# Patient Record
Sex: FEMALE | Race: WHITE | Employment: UNEMPLOYED | URBAN - METROPOLITAN AREA
[De-identification: names, ages, dates, MRNs, and addresses within clinical notes are randomized per-mention and may not be internally consistent; named-entity substitution may affect disease eponyms.]

---

## 2019-10-18 ENCOUNTER — HOSPITAL ENCOUNTER (OUTPATIENT)
Age: 39
Discharge: HOME | End: 2019-10-18
Payer: COMMERCIAL

## 2019-10-18 PROCEDURE — 90471 IMMUNIZATION ADMIN: CPT

## 2019-10-18 PROCEDURE — 90686 IIV4 VACC NO PRSV 0.5 ML IM: CPT

## 2020-09-28 ENCOUNTER — LAB REQUISITION (OUTPATIENT)
Dept: LAB | Facility: HOSPITAL | Age: 40
End: 2020-09-28
Payer: COMMERCIAL

## 2020-09-28 DIAGNOSIS — Z11.51 ENCOUNTER FOR SCREENING FOR HUMAN PAPILLOMAVIRUS (HPV): ICD-10-CM

## 2020-09-28 DIAGNOSIS — Z12.4 ENCOUNTER FOR SCREENING FOR MALIGNANT NEOPLASM OF CERVIX: ICD-10-CM

## 2020-09-28 DIAGNOSIS — Z01.419 ENCOUNTER FOR GYNECOLOGICAL EXAMINATION (GENERAL) (ROUTINE) WITHOUT ABNORMAL FINDINGS: ICD-10-CM

## 2020-09-28 PROCEDURE — 87624 HPV HI-RISK TYP POOLED RSLT: CPT | Performed by: OBSTETRICS & GYNECOLOGY

## 2020-09-28 PROCEDURE — 88175 CYTOPATH C/V AUTO FLUID REDO: CPT | Performed by: OBSTETRICS & GYNECOLOGY

## 2020-10-14 ENCOUNTER — HOSPITAL ENCOUNTER (OUTPATIENT)
Age: 40
Discharge: HOME | End: 2020-10-14
Payer: COMMERCIAL

## 2020-10-14 PROCEDURE — 90471 IMMUNIZATION ADMIN: CPT

## 2020-10-14 PROCEDURE — 90686 IIV4 VACC NO PRSV 0.5 ML IM: CPT

## 2021-10-13 ENCOUNTER — HOSPITAL ENCOUNTER (OUTPATIENT)
Age: 41
Discharge: HOME | End: 2021-10-13
Payer: COMMERCIAL

## 2021-10-13 VITALS — TEMPERATURE: 98 F

## 2021-10-13 PROCEDURE — 90471 IMMUNIZATION ADMIN: CPT | Performed by: EMERGENCY MEDICINE

## 2021-10-13 PROCEDURE — 90686 IIV4 VACC NO PRSV 0.5 ML IM: CPT | Performed by: EMERGENCY MEDICINE

## 2022-12-08 ENCOUNTER — HOSPITAL ENCOUNTER (OUTPATIENT)
Age: 42
Discharge: HOME | End: 2022-12-08
Payer: COMMERCIAL

## 2022-12-08 PROCEDURE — 90471 IMMUNIZATION ADMIN: CPT | Performed by: NURSE PRACTITIONER

## 2022-12-08 PROCEDURE — 90686 IIV4 VACC NO PRSV 0.5 ML IM: CPT | Performed by: NURSE PRACTITIONER

## 2023-07-10 ENCOUNTER — LAB ENCOUNTER (OUTPATIENT)
Dept: LAB | Facility: REFERENCE LAB | Age: 43
End: 2023-07-10
Attending: FAMILY MEDICINE
Payer: COMMERCIAL

## 2023-07-10 ENCOUNTER — OFFICE VISIT (OUTPATIENT)
Facility: CLINIC | Age: 43
End: 2023-07-10
Payer: COMMERCIAL

## 2023-07-10 VITALS
OXYGEN SATURATION: 99 % | WEIGHT: 96 LBS | BODY MASS INDEX: 19.35 KG/M2 | DIASTOLIC BLOOD PRESSURE: 76 MMHG | SYSTOLIC BLOOD PRESSURE: 120 MMHG | HEIGHT: 59 IN | HEART RATE: 97 BPM

## 2023-07-10 DIAGNOSIS — F41.9 ANXIETY: ICD-10-CM

## 2023-07-10 DIAGNOSIS — Z12.4 PAP SMEAR FOR CERVICAL CANCER SCREENING: ICD-10-CM

## 2023-07-10 DIAGNOSIS — Z00.00 ENCOUNTER FOR ROUTINE ADULT HEALTH EXAMINATION WITHOUT ABNORMAL FINDINGS: Primary | ICD-10-CM

## 2023-07-10 DIAGNOSIS — Z12.31 SCREENING MAMMOGRAM, ENCOUNTER FOR: ICD-10-CM

## 2023-07-10 DIAGNOSIS — Z00.00 ENCOUNTER FOR ROUTINE ADULT HEALTH EXAMINATION WITHOUT ABNORMAL FINDINGS: ICD-10-CM

## 2023-07-10 LAB
ALBUMIN SERPL-MCNC: 4 G/DL (ref 3.4–5)
ALBUMIN/GLOB SERPL: 1.1 {RATIO} (ref 1–2)
ALP LIVER SERPL-CCNC: 75 U/L
ALT SERPL-CCNC: 20 U/L
ANION GAP SERPL CALC-SCNC: 7 MMOL/L (ref 0–18)
AST SERPL-CCNC: 17 U/L (ref 15–37)
BASOPHILS # BLD AUTO: 0.04 X10(3) UL (ref 0–0.2)
BASOPHILS NFR BLD AUTO: 0.7 %
BILIRUB SERPL-MCNC: 0.5 MG/DL (ref 0.1–2)
BUN BLD-MCNC: 13 MG/DL (ref 7–18)
BUN/CREAT SERPL: 15.5 (ref 10–20)
CALCIUM BLD-MCNC: 9.1 MG/DL (ref 8.5–10.1)
CHLORIDE SERPL-SCNC: 107 MMOL/L (ref 98–112)
CHOLEST SERPL-MCNC: 194 MG/DL (ref ?–200)
CO2 SERPL-SCNC: 27 MMOL/L (ref 21–32)
CREAT BLD-MCNC: 0.84 MG/DL
DEPRECATED RDW RBC AUTO: 41.2 FL (ref 35.1–46.3)
EOSINOPHIL # BLD AUTO: 0.18 X10(3) UL (ref 0–0.7)
EOSINOPHIL NFR BLD AUTO: 3.3 %
ERYTHROCYTE [DISTWIDTH] IN BLOOD BY AUTOMATED COUNT: 11.4 % (ref 11–15)
EST. AVERAGE GLUCOSE BLD GHB EST-MCNC: 105 MG/DL (ref 68–126)
FASTING PATIENT LIPID ANSWER: NO
FASTING STATUS PATIENT QL REPORTED: NO
GFR SERPLBLD BASED ON 1.73 SQ M-ARVRAT: 89 ML/MIN/1.73M2 (ref 60–?)
GLOBULIN PLAS-MCNC: 3.5 G/DL (ref 2.8–4.4)
GLUCOSE BLD-MCNC: 88 MG/DL (ref 70–99)
HBA1C MFR BLD: 5.3 % (ref ?–5.7)
HCT VFR BLD AUTO: 45.3 %
HCV AB SERPL QL IA: NONREACTIVE
HDLC SERPL-MCNC: 89 MG/DL (ref 40–59)
HGB BLD-MCNC: 15.2 G/DL
IMM GRANULOCYTES # BLD AUTO: 0.01 X10(3) UL (ref 0–1)
IMM GRANULOCYTES NFR BLD: 0.2 %
LDLC SERPL CALC-MCNC: 90 MG/DL (ref ?–100)
LYMPHOCYTES # BLD AUTO: 1.2 X10(3) UL (ref 1–4)
LYMPHOCYTES NFR BLD AUTO: 21.7 %
MCH RBC QN AUTO: 33 PG (ref 26–34)
MCHC RBC AUTO-ENTMCNC: 33.6 G/DL (ref 31–37)
MCV RBC AUTO: 98.3 FL
MONOCYTES # BLD AUTO: 0.6 X10(3) UL (ref 0.1–1)
MONOCYTES NFR BLD AUTO: 10.8 %
NEUTROPHILS # BLD AUTO: 3.5 X10 (3) UL (ref 1.5–7.7)
NEUTROPHILS # BLD AUTO: 3.5 X10(3) UL (ref 1.5–7.7)
NEUTROPHILS NFR BLD AUTO: 63.3 %
NONHDLC SERPL-MCNC: 105 MG/DL (ref ?–130)
OSMOLALITY SERPL CALC.SUM OF ELEC: 292 MOSM/KG (ref 275–295)
PLATELET # BLD AUTO: 330 10(3)UL (ref 150–450)
POTASSIUM SERPL-SCNC: 3.8 MMOL/L (ref 3.5–5.1)
PROT SERPL-MCNC: 7.5 G/DL (ref 6.4–8.2)
RBC # BLD AUTO: 4.61 X10(6)UL
SODIUM SERPL-SCNC: 141 MMOL/L (ref 136–145)
TRIGL SERPL-MCNC: 82 MG/DL (ref 30–149)
VLDLC SERPL CALC-MCNC: 13 MG/DL (ref 0–30)
WBC # BLD AUTO: 5.5 X10(3) UL (ref 4–11)

## 2023-07-10 PROCEDURE — 83036 HEMOGLOBIN GLYCOSYLATED A1C: CPT

## 2023-07-10 PROCEDURE — 86803 HEPATITIS C AB TEST: CPT

## 2023-07-10 PROCEDURE — 80053 COMPREHEN METABOLIC PANEL: CPT

## 2023-07-10 PROCEDURE — 36415 COLL VENOUS BLD VENIPUNCTURE: CPT

## 2023-07-10 PROCEDURE — 87624 HPV HI-RISK TYP POOLED RSLT: CPT | Performed by: FAMILY MEDICINE

## 2023-07-10 PROCEDURE — 85025 COMPLETE CBC W/AUTO DIFF WBC: CPT

## 2023-07-10 PROCEDURE — 80061 LIPID PANEL: CPT

## 2023-07-10 RX ORDER — LORAZEPAM 0.5 MG/1
0.5 TABLET ORAL DAILY PRN
Qty: 10 TABLET | Refills: 0 | Status: SHIPPED | OUTPATIENT
Start: 2023-07-10

## 2023-07-10 RX ORDER — HYDROXYZINE HYDROCHLORIDE 25 MG/1
25 TABLET, FILM COATED ORAL NIGHTLY PRN
Qty: 30 TABLET | Refills: 1 | Status: SHIPPED | OUTPATIENT
Start: 2023-07-10

## 2023-07-11 LAB — HPV I/H RISK 1 DNA SPEC QL NAA+PROBE: NEGATIVE

## 2023-07-18 LAB
LAST PAP RESULT: NORMAL
PAP HISTORY (OTHER THAN LAST PAP): NORMAL

## 2023-08-30 ENCOUNTER — HOSPITAL ENCOUNTER (OUTPATIENT)
Dept: MAMMOGRAPHY | Age: 43
Discharge: HOME | End: 2023-08-30
Attending: FAMILY MEDICINE
Payer: COMMERCIAL

## 2023-08-30 DIAGNOSIS — Z12.31 SCREENING MAMMOGRAM, ENCOUNTER FOR: ICD-10-CM

## 2023-08-30 PROCEDURE — 77063 BREAST TOMOSYNTHESIS BI: CPT | Performed by: FAMILY MEDICINE

## 2023-08-30 PROCEDURE — 77067 SCR MAMMO BI INCL CAD: CPT | Performed by: FAMILY MEDICINE

## 2023-09-09 ENCOUNTER — PATIENT MESSAGE (OUTPATIENT)
Facility: CLINIC | Age: 43
End: 2023-09-09

## 2023-09-10 NOTE — TELEPHONE ENCOUNTER
Please review; protocol failed. Requested Prescriptions   Pending Prescriptions Disp Refills    LORazepam 0.5 MG Oral Tab 10 tablet 0     Sig: Take 1 tablet (0.5 mg total) by mouth daily as needed for Anxiety (Panic attacks).        There is no refill protocol information for this order              Recent Outpatient Visits              2 months ago Encounter for routine adult health examination without abnormal findings    [address], [place], [place]    Office Visit

## 2023-09-11 RX ORDER — LORAZEPAM 0.5 MG/1
0.5 TABLET ORAL DAILY PRN
Qty: 10 TABLET | Refills: 0 | Status: SHIPPED | OUTPATIENT
Start: 2023-09-11

## 2023-12-06 ENCOUNTER — PATIENT MESSAGE (OUTPATIENT)
Facility: CLINIC | Age: 43
End: 2023-12-06

## 2023-12-07 RX ORDER — LORAZEPAM 0.5 MG/1
0.5 TABLET ORAL DAILY PRN
Qty: 15 TABLET | Refills: 1 | Status: SHIPPED | OUTPATIENT
Start: 2023-12-07

## 2023-12-07 NOTE — TELEPHONE ENCOUNTER
Please review; protocol failed. Please see patients MyChart Message     Eusebia Any PAUL Em Triage Support (supporting Eliverto Bloch, DO)23 hours ago (12:06 PM)     Pablo Moncada,      I have a small amount of medicine left from the anxiety medication refill. With the upcoming holidays and its my mother's birthday, I am feeling anxious and stressed along with my grief. I am trying to have outlets, speaking to a counselor and being open with friends and family, but would appreciate my prescription being refilled so I can stay ahead of the physical aspect of oncoming anxiety. Please let me know if you have any questions or concerns. I appreciate your help and understanding. Requested Prescriptions   Pending Prescriptions Disp Refills    LORazepam 0.5 MG Oral Tab 10 tablet 0     Sig: Take 1 tablet (0.5 mg total) by mouth daily as needed for Anxiety (Panic attacks).        There is no refill protocol information for this order        Recent Outpatient Visits              5 months ago Encounter for routine adult health examination without abnormal findings    [facility] [place], [place]    Office Visit

## 2023-12-07 NOTE — TELEPHONE ENCOUNTER
From: Stepan An  To: Wisamroyce Nur  Sent: 12/6/2023 12:06 PM CST  Subject: Medication Refill     Hi Dr. Mary Harrison,     I have a small amount of medicine left from the anxiety medication refill. With the upcoming holidays and its my mother's birthday, I am feeling anxious and stressed along with my grief. I am trying to have outlets, speaking to a counselor and being open with friends and family, but would appreciate my prescription being refilled so I can stay ahead of the physical aspect of oncoming anxiety. Please let me know if you have any questions or concerns. I appreciate your help and understanding.

## 2024-08-12 ENCOUNTER — NURSE TRIAGE (OUTPATIENT)
Facility: CLINIC | Age: 44
End: 2024-08-12

## 2024-08-12 ENCOUNTER — PATIENT MESSAGE (OUTPATIENT)
Facility: CLINIC | Age: 44
End: 2024-08-12

## 2024-08-12 NOTE — TELEPHONE ENCOUNTER
From: Nathaly An  To: Tootie Cohen  Sent: 8/12/2024 7:24 AM CDT  Subject: Skin irritation     Hello,    Since I have [facility] HMO, I wasn’t sure how I need to go about seeing a dermatologist, if I needed a referral or not. I have a mole on my lower back, and woke up yesterday to a red rash [place] around it. There is no discoloration or change in size to the mole, but it is surrounded by this [place]. I was hoping it was just an irritation, but it is still there this morning. Is it better to go to urgent care or am I able to find an in-network dermatologist? Or do I need a referral? Thank you so much for your help.    Nathaly An

## 2024-08-12 NOTE — TELEPHONE ENCOUNTER
Action Requested: Summary for Provider     []  Critical Lab, Recommendations Needed  [] Need Additional Advice  []   FYI    []   Need Orders  [] Need Medications Sent to Pharmacy  []  Other     SUMMARY: Patient stated around her mole on right hip, has had mole for years, raised with no change.    Yesterday had a red rash, was not sure if it was her pants, today still with red rash with no change in size    Denies: fever, chills, pain    Future Appointments   Date Time Provider Department Center   8/13/2024  4:00 PM Elvira Adorno, [id number] [place]   10/30/2024  9:00 AM Tootie Cohen DO [facility]         Reason for Disposition   Caller can't describe it clearly    Protocols used: Skin Lesion - Moles or Growths-A-OH

## 2024-08-13 ENCOUNTER — OFFICE VISIT (OUTPATIENT)
Dept: INTERNAL MEDICINE CLINIC | Facility: CLINIC | Age: 44
End: 2024-08-13
Payer: COMMERCIAL

## 2024-08-13 VITALS
BODY MASS INDEX: 20.16 KG/M2 | HEART RATE: 80 BPM | HEIGHT: 59 IN | SYSTOLIC BLOOD PRESSURE: 136 MMHG | WEIGHT: 100 LBS | DIASTOLIC BLOOD PRESSURE: 89 MMHG

## 2024-08-13 DIAGNOSIS — D48.9 NEOPLASM OF UNCERTAIN BEHAVIOR: Primary | ICD-10-CM

## 2024-08-13 PROCEDURE — 99203 OFFICE O/P NEW LOW 30 MIN: CPT | Performed by: NURSE PRACTITIONER

## 2024-08-13 PROCEDURE — 3075F SYST BP GE 130 - 139MM HG: CPT | Performed by: NURSE PRACTITIONER

## 2024-08-13 PROCEDURE — 3079F DIAST BP 80-89 MM HG: CPT | Performed by: NURSE PRACTITIONER

## 2024-08-13 PROCEDURE — 3008F BODY MASS INDEX DOCD: CPT | Performed by: NURSE PRACTITIONER

## 2024-08-13 NOTE — PROGRESS NOTES
Nathaly An is a 44 year old female.  HPI:   Pt reports a mole that is flesh colored and raised for several years but over the past couple of days noticed redness around it. No fever, chills, exudates, pain, loss of appetite or weight changes. Reports it itches.   Current Outpatient Medications   Medication Sig Dispense Refill    hydrocortisone 2.5 % External Cream Apply to affected area twice daily 20 g 0    LORazepam 0.5 MG Oral Tab Take 1 tablet (0.5 mg total) by mouth daily as needed for Anxiety (Panic attacks). 15 tablet 1      Past Medical History:    Allergic rhinitis    Anxiety    Mother diagnosed with cancer and passed away suddenly at the end of March 2023.      Social History:  Social History     Socioeconomic History    Marital status:    Tobacco Use    Smoking status: Never   Vaping Use    Vaping status: Never Used   Substance and Sexual Activity    Alcohol use: Yes     Alcohol/week: 2.0 standard drinks of alcohol     Types: 2 Glasses of wine per week     Comment: Maybe occurs once a month    Drug use: Never    Sexual activity: Yes     Partners: Male     Comment:  has azospermia   Other Topics Concern    Caffeine Concern No    Exercise Yes     Comment: I walk daily    Seat Belt Yes     Comment: I wear my seatbelt, as does everyone in my vehicle    Special Diet No    Stress Concern No    Weight Concern No        REVIEW OF SYSTEMS:   Review of Systems   All other systems reviewed and are negative.         EXAM:   /89 (BP Location: Right arm, Patient Position: Sitting, Cuff Size: adult)   Pulse 80   Ht 4' 11\" (1.499 m)   Wt 100 lb (45.4 kg)   LMP 07/27/2024   BMI 20.20 kg/m²     Physical Exam  Vitals reviewed.   HENT:      Head: Normocephalic.   Musculoskeletal:      Right lower leg: No edema.      Left lower leg: No edema.   Skin:     General: Skin is warm.      Coloration: Skin is not jaundiced.      Comments: R lower back raised, nodular growth, fleshy, no discharge,  with surrounding erythema.    Neurological:      Mental Status: She is alert and oriented to person, place, and time.            ASSESSMENT AND PLAN:   1. Neoplasm of uncertain behavior  -referral to Derm, would benefit from bx/excision.   -Topical steroid cream.   -PO zyrtec.   - Derm Referral - In Network  - hydrocortisone 2.5 % External Cream; Apply to affected area twice daily  Dispense: 20 g; Refill: 0       The patient indicates understanding of these issues and agrees to the plan.  The patient is asked to return in PRN.     The above note was creating using Dragon speech recognition technology. Please excuse any typos.

## 2024-08-21 ENCOUNTER — NURSE TRIAGE (OUTPATIENT)
Facility: CLINIC | Age: 44
End: 2024-08-21

## 2024-08-21 NOTE — TELEPHONE ENCOUNTER
Action Requested: Summary for Provider     []  Critical Lab, Recommendations Needed  [] Need Additional Advice  []   FYI    []   Need Orders  [] Need Medications Sent to Pharmacy  []  Other     SUMMARY: Recommended visit today, patient electing to go to immediate care based on availability     Reason for call: Urinary Symptoms (/)  Onset: 3 days     Urinary symptoms started 3 days ago. Patient has been pushing fluids. In the morning she has frequency and strong odor. Patient also has discomfort with urination but denies burning. Afebrile, denies back/flank pain.     Reason for Disposition   Urinating more frequently than usual (i.e., frequency)    Protocols used: Urinary Symptoms-A-OH

## 2024-08-25 ENCOUNTER — HOSPITAL ENCOUNTER (OUTPATIENT)
Age: 44
Discharge: HOME | End: 2024-08-25
Payer: COMMERCIAL

## 2024-08-25 VITALS
OXYGEN SATURATION: 100 % | HEART RATE: 77 BPM | DIASTOLIC BLOOD PRESSURE: 78 MMHG | RESPIRATION RATE: 18 BRPM | SYSTOLIC BLOOD PRESSURE: 146 MMHG | TEMPERATURE: 97 F

## 2024-08-25 DIAGNOSIS — N39.0 URINARY TRACT INFECTION WITHOUT HEMATURIA, SITE UNSPECIFIED: ICD-10-CM

## 2024-08-25 DIAGNOSIS — R39.9 URINARY SYMPTOM OR SIGN: Primary | ICD-10-CM

## 2024-08-25 LAB
BILIRUB UR QL STRIP: NEGATIVE
COLOR UR: YELLOW
GLUCOSE UR STRIP-MCNC: NEGATIVE MG/DL
KETONES UR STRIP-MCNC: NEGATIVE MG/DL
NITRITE UR QL STRIP: NEGATIVE
PH UR STRIP: 7 [PH]
PROT UR STRIP-MCNC: NEGATIVE MG/DL
SP GR UR STRIP: 1.01
UROBILINOGEN UR STRIP-ACNC: <2 MG/DL

## 2024-08-25 PROCEDURE — 81002 URINALYSIS NONAUTO W/O SCOPE: CPT | Performed by: NURSE PRACTITIONER

## 2024-08-25 PROCEDURE — 99214 OFFICE O/P EST MOD 30 MIN: CPT | Performed by: NURSE PRACTITIONER

## 2024-08-25 RX ORDER — SULFAMETHOXAZOLE/TRIMETHOPRIM 800-160 MG
1 TABLET ORAL 2 TIMES DAILY
Qty: 14 TABLET | Refills: 0 | Status: SHIPPED | OUTPATIENT
Start: 2024-08-25 | End: 2024-09-01

## 2024-08-25 NOTE — ED INITIAL ASSESSMENT (HPI)
Patient reports having urinary urgency a few days ago, felt better and then this morning started to have urinary urgency again and pressure after  urinating. Denies fevers or chills, abd pain, flank pain.

## 2024-08-25 NOTE — ED PROVIDER NOTES
Patient Seen in: [facility]      History     Chief Complaint   Patient presents with    Urinary Symptoms     Stated Complaint: Urinary Symptoms    Subjective:   HPI    12-year-old female presents with concern for having a UTI.  She states she developed some random symptoms 4 days ago.  Especially urinary frequency but that resolved until this morning.  There is no fever.  There is no nausea there is no vomiting.    Objective:   Past Medical History:    Allergic rhinitis    Anxiety    Mother diagnosed with cancer and passed away suddenly at the end of 2023.              Past Surgical History:   Procedure Laterality Date      May 15, 2017    Pregnancy via IVF                Social History     Socioeconomic History    Marital status:    Tobacco Use    Smoking status: Never   Vaping Use    Vaping status: Never Used   Substance and Sexual Activity    Alcohol use: Yes     Alcohol/week: 2.0 standard drinks of alcohol     Types: 2 Glasses of wine per week     Comment: Maybe occurs once a month    Drug use: Never    Sexual activity: Yes     Partners: Male     Comment:  has azospermia   Other Topics Concern    Caffeine Concern No    Exercise Yes     Comment: I walk daily    Seat Belt Yes     Comment: I wear my seatbelt, as does everyone in my vehicle    Special Diet No    Stress Concern No    Weight Concern No              Review of Systems    Positive for stated Chief Complaint: Urinary Symptoms    Other systems are as noted in HPI.  Constitutional and vital signs reviewed.      All other systems reviewed and negative except as noted above.    Physical Exam     ED Triage Vitals   BP 24 1106 146/78   Pulse 24 1106 77   Resp 24 1106 18   Temp 24 1107 97.4 °F (36.3 °C)   Temp src 24 1107 Oral   SpO2 24 1106 100 %   O2 Device 24 1106 None (Room air)       Current Vitals:   Vital Signs  BP: 146/78  Pulse: 77  Resp: 18  Temp: 97.4 °F (36.3 °C)  Temp  src: Oral    Oxygen Therapy  SpO2: 100 %  O2 Device: None (Room air)            Physical Exam  Vitals reviewed.   Constitutional:       General: She is not in acute distress.  HENT:      Nose: Nose normal.      Mouth/Throat:      Mouth: Mucous membranes are moist.   Cardiovascular:      Rate and Rhythm: Normal rate and regular rhythm.   Pulmonary:      Effort: Pulmonary effort is normal.      Breath sounds: Normal breath sounds.   Abdominal:      Palpations: Abdomen is soft.      Tenderness: There is no abdominal tenderness. There is no right CVA tenderness or left CVA tenderness.   Musculoskeletal:         General: Normal range of motion.   Skin:     General: Skin is warm and dry.   Neurological:      General: No focal deficit present.      Mental Status: She is alert and oriented to person, place, and time.   Psychiatric:         Mood and Affect: Mood normal.         Behavior: Behavior normal.               ED Course     Labs Reviewed   [facility] POCT URINALYSIS DIPSTICK - Abnormal; Notable for the following components:       Result Value    Urine Clarity Slightly cloudy (*)     Blood, Urine Large (*)     Leukocyte esterase urine Small (*)     All other components within normal limits   URINE CULTURE, ROUTINE                      MDM                                         Medical Decision Making  44-year-old female presents with suspicion for having a UTI.  Differential diagnosis includes urinary tract infection, pyelonephritis, STD, pregnancy.  Urine specimen was collected and does show small leukocytes and large blood.  Results were discussed with patient.  Prescription for Bactrim was sent to patient's pharmacy.  She was given printed instructions for care of other symptoms.    Amount and/or Complexity of Data Reviewed  Labs: ordered.     Details: POC urine shows small leuks, large blood.      Risk  OTC drugs.  Prescription drug management.        Disposition and Plan     Clinical Impression:  1. Urinary symptom or  sign    2. Urinary tract infection without hematuria, site unspecified         Disposition:  Discharge  8/25/2024 11:19 am    Follow-up:  Tootie Cohen DO  [address] [address]  [phone]      If symptoms worsen          Medications Prescribed:  Discharge Medication List as of 8/25/2024 11:19 AM        START taking these medications    Details   sulfamethoxazole-trimethoprim -160 MG Oral Tab per tablet Take 1 tablet by mouth 2 (two) times daily for 7 days., Normal, Disp-14 tablet, R-0

## 2024-08-27 ENCOUNTER — TELEPHONE (OUTPATIENT)
Facility: CLINIC | Age: 44
End: 2024-08-27

## 2024-08-27 NOTE — TELEPHONE ENCOUNTER
Spoke to patient (verified Name and ) and relayed Dr. Cohen's message below. Patient verbalized understanding and had no further questions at this time.

## 2024-08-27 NOTE — TELEPHONE ENCOUNTER
Agree with stopping antibiotic given negative urine culture, but if symptoms return should call for a visit.

## 2024-08-27 NOTE — TELEPHONE ENCOUNTER
Dr Cohen=see below, any additional advise ? Thanks.     She went to an urgent care on 8/25/24 due to UTI symptoms.   Dipstick urine test was positive for UTI and sulfamethoxazole-trimethoprim -160 MG Oral Tab prescribed to be taken twice a day for 7 days.     She had her menstrual period on the day of the urine test .   Today is the second day of antibiotic.   Urgent care nurse called her today  and informed that the urine culture does not show UTI, and instructed to stop taking the antibiotic.   She would like to know what to do .     Per patient, symptoms gone away.   RN reviewed labs 8/25/24==urine culture possible contamination.     Instructed patient that if she is asymptomatic now, stop taking the antibiotic.   RN will send a message to Dr Cohen for additional recommendation.  .

## 2024-09-11 ENCOUNTER — OFFICE VISIT (OUTPATIENT)
Dept: DERMATOLOGY CLINIC | Facility: CLINIC | Age: 44
End: 2024-09-11
Payer: COMMERCIAL

## 2024-09-11 DIAGNOSIS — D22.9 BENIGN MOLE: Primary | ICD-10-CM

## 2024-09-11 PROCEDURE — 99203 OFFICE O/P NEW LOW 30 MIN: CPT | Performed by: STUDENT IN AN ORGANIZED HEALTH CARE EDUCATION/TRAINING PROGRAM

## 2024-09-11 NOTE — PROGRESS NOTES
New Patient    Referred by: No referring provider defined for this encounter.    CHIEF COMPLAINT: Lesion of concern     HISTORY OF PRESENT ILLNESS: Nathaly An is a 44 year old female here for evaluation of lesion of concern.    1. Growth   Location: upper buttocks R   Duration: years   Signs and symptoms: Mole with red ring around it, itchy and raised   Current treatment: none   Past treatments: Hydrocortisone; helped itchiness     Personal Dermatologic History  History of skin cancer: No  History of  atypical moles: No    FAMILY HISTORY:  History of melanoma: No    Past Medical History  Past Medical History:    Allergic rhinitis    Anxiety    Mother diagnosed with cancer and passed away suddenly at the end of March 2023.       REVIEW OF SYSTEMS:  Constitutional: Denies fever, chills, unintentional weight loss.   Skin as per HPI    Medications  Current Outpatient Medications   Medication Sig Dispense Refill    hydrocortisone 2.5 % External Cream Apply to affected area twice daily 20 g 0    LORazepam 0.5 MG Oral Tab Take 1 tablet (0.5 mg total) by mouth daily as needed for Anxiety (Panic attacks). 15 tablet 1       PHYSICAL EXAM:  General: awake, alert, no acute distress  Neuropsych: appropriate mood and affect  Eyes: Sclerae anicteric, without conjunctival injection, eyelids unremarkable  Skin: Skin exam was performed today including the following: buttock. Pertinent findings include:   - with a regular appearing skin colored papule    ASSESSMENT & PLAN:  Pathophysiology of diagnoses discussed with patient.  Therapeutic options reviewed. Risks, benefits, and alternatives discussed with patient. Instructions reviewed at length.    #Benign nevus - erythema that is now resolving possibly due to oak leaf mite bite bite  - Reassured regarding benign nature. No treatment necessary unless symptomatic/changing.   - Recommend sun protection with SPF 30 or higher, sun protective clothing such as wide brimmed hats  and long sleeves. Recommend avoiding midday sun (10 am- 3 pm).      Return to clinic: as needed or sooner if something concerning arises     Shankar Pappas MD

## 2024-10-30 ENCOUNTER — LAB ENCOUNTER (OUTPATIENT)
Dept: LAB | Facility: REFERENCE LAB | Age: 44
End: 2024-10-30
Attending: FAMILY MEDICINE
Payer: COMMERCIAL

## 2024-10-30 ENCOUNTER — OFFICE VISIT (OUTPATIENT)
Facility: CLINIC | Age: 44
End: 2024-10-30
Payer: COMMERCIAL

## 2024-10-30 VITALS
SYSTOLIC BLOOD PRESSURE: 102 MMHG | BODY MASS INDEX: 20.56 KG/M2 | HEART RATE: 76 BPM | WEIGHT: 102 LBS | HEIGHT: 59 IN | OXYGEN SATURATION: 99 % | DIASTOLIC BLOOD PRESSURE: 72 MMHG

## 2024-10-30 DIAGNOSIS — Z12.31 SCREENING MAMMOGRAM, ENCOUNTER FOR: ICD-10-CM

## 2024-10-30 DIAGNOSIS — Z00.00 ENCOUNTER FOR ROUTINE ADULT HEALTH EXAMINATION WITHOUT ABNORMAL FINDINGS: Primary | ICD-10-CM

## 2024-10-30 DIAGNOSIS — Z00.00 ENCOUNTER FOR ROUTINE ADULT HEALTH EXAMINATION WITHOUT ABNORMAL FINDINGS: ICD-10-CM

## 2024-10-30 LAB
ALBUMIN SERPL-MCNC: 4.6 G/DL (ref 3.2–4.8)
ALBUMIN/GLOB SERPL: 1.8 {RATIO} (ref 1–2)
ALP LIVER SERPL-CCNC: 59 U/L
ALT SERPL-CCNC: 12 U/L
ANION GAP SERPL CALC-SCNC: 6 MMOL/L (ref 0–18)
AST SERPL-CCNC: 18 U/L (ref ?–34)
BASOPHILS # BLD AUTO: 0.04 X10(3) UL (ref 0–0.2)
BASOPHILS NFR BLD AUTO: 0.7 %
BILIRUB SERPL-MCNC: 0.4 MG/DL (ref 0.3–1.2)
BUN BLD-MCNC: 13 MG/DL (ref 9–23)
BUN/CREAT SERPL: 16.9 (ref 10–20)
CALCIUM BLD-MCNC: 9.3 MG/DL (ref 8.7–10.4)
CHLORIDE SERPL-SCNC: 107 MMOL/L (ref 98–112)
CHOLEST SERPL-MCNC: 177 MG/DL (ref ?–200)
CO2 SERPL-SCNC: 27 MMOL/L (ref 21–32)
CREAT BLD-MCNC: 0.77 MG/DL
DEPRECATED RDW RBC AUTO: 41.6 FL (ref 35.1–46.3)
EGFRCR SERPLBLD CKD-EPI 2021: 99 ML/MIN/1.73M2 (ref 60–?)
EOSINOPHIL # BLD AUTO: 0.16 X10(3) UL (ref 0–0.7)
EOSINOPHIL NFR BLD AUTO: 2.9 %
ERYTHROCYTE [DISTWIDTH] IN BLOOD BY AUTOMATED COUNT: 11.3 % (ref 11–15)
EST. AVERAGE GLUCOSE BLD GHB EST-MCNC: 100 MG/DL (ref 68–126)
FASTING PATIENT LIPID ANSWER: YES
FASTING STATUS PATIENT QL REPORTED: YES
GLOBULIN PLAS-MCNC: 2.5 G/DL (ref 2–3.5)
GLUCOSE BLD-MCNC: 89 MG/DL (ref 70–99)
HBA1C MFR BLD: 5.1 % (ref ?–5.7)
HCT VFR BLD AUTO: 43 %
HDLC SERPL-MCNC: 72 MG/DL (ref 40–59)
HGB BLD-MCNC: 14.3 G/DL
IMM GRANULOCYTES # BLD AUTO: 0.01 X10(3) UL (ref 0–1)
IMM GRANULOCYTES NFR BLD: 0.2 %
LDLC SERPL CALC-MCNC: 95 MG/DL (ref ?–100)
LYMPHOCYTES # BLD AUTO: 1.47 X10(3) UL (ref 1–4)
LYMPHOCYTES NFR BLD AUTO: 26.2 %
MCH RBC QN AUTO: 32.6 PG (ref 26–34)
MCHC RBC AUTO-ENTMCNC: 33.3 G/DL (ref 31–37)
MCV RBC AUTO: 98.2 FL
MONOCYTES # BLD AUTO: 0.65 X10(3) UL (ref 0.1–1)
MONOCYTES NFR BLD AUTO: 11.6 %
NEUTROPHILS # BLD AUTO: 3.28 X10 (3) UL (ref 1.5–7.7)
NEUTROPHILS # BLD AUTO: 3.28 X10(3) UL (ref 1.5–7.7)
NEUTROPHILS NFR BLD AUTO: 58.4 %
NONHDLC SERPL-MCNC: 105 MG/DL (ref ?–130)
OSMOLALITY SERPL CALC.SUM OF ELEC: 290 MOSM/KG (ref 275–295)
PLATELET # BLD AUTO: 288 10(3)UL (ref 150–450)
POTASSIUM SERPL-SCNC: 3.9 MMOL/L (ref 3.5–5.1)
PROT SERPL-MCNC: 7.1 G/DL (ref 5.7–8.2)
RBC # BLD AUTO: 4.38 X10(6)UL
SODIUM SERPL-SCNC: 140 MMOL/L (ref 136–145)
TRIGL SERPL-MCNC: 49 MG/DL (ref 30–149)
VLDLC SERPL CALC-MCNC: 8 MG/DL (ref 0–30)
WBC # BLD AUTO: 5.6 X10(3) UL (ref 4–11)

## 2024-10-30 PROCEDURE — 36415 COLL VENOUS BLD VENIPUNCTURE: CPT

## 2024-10-30 PROCEDURE — 83036 HEMOGLOBIN GLYCOSYLATED A1C: CPT

## 2024-10-30 PROCEDURE — 3078F DIAST BP <80 MM HG: CPT | Performed by: FAMILY MEDICINE

## 2024-10-30 PROCEDURE — 99396 PREV VISIT EST AGE 40-64: CPT | Performed by: FAMILY MEDICINE

## 2024-10-30 PROCEDURE — 3008F BODY MASS INDEX DOCD: CPT | Performed by: FAMILY MEDICINE

## 2024-10-30 PROCEDURE — 80053 COMPREHEN METABOLIC PANEL: CPT

## 2024-10-30 PROCEDURE — 90656 IIV3 VACC NO PRSV 0.5 ML IM: CPT | Performed by: FAMILY MEDICINE

## 2024-10-30 PROCEDURE — 3074F SYST BP LT 130 MM HG: CPT | Performed by: FAMILY MEDICINE

## 2024-10-30 PROCEDURE — 90471 IMMUNIZATION ADMIN: CPT | Performed by: FAMILY MEDICINE

## 2024-10-30 PROCEDURE — 85025 COMPLETE CBC W/AUTO DIFF WBC: CPT

## 2024-10-30 PROCEDURE — 80061 LIPID PANEL: CPT

## 2024-10-30 RX ORDER — MAGNESIUM OXIDE 400 MG (241.3 MG MAGNESIUM) TABLET
1 TABLET NIGHTLY
COMMUNITY

## 2024-10-30 NOTE — PROGRESS NOTES
HPI:   Nathaly An is a 44 year old female who presents for a complete physical exam.     Still has Lorazepam as needed due to anxiety and stress since her mom passed away suddenly last spring. She is managing better now with breathing, time away, and taking breaks. She does have triggers with certain dates. She had found a grief therapist, but she has not heard back from her recently. Will take Melatonin to help with sleep, but it is not always great.     Last pap: 7/2023 and normal  Last mammogram: 8/2023 and normal    Menses: Regular, monthly cycles   Contraception:   has azospermia   Previous colonoscopy: Never had one before   History of STD's: None  History of intimate partner violence: None  Family hx of breast, ovarian, cervical or colon CA: None  Diet and exercise: Breakfast is usually fruit and a lot of water. Lunch is a sandwich, meat, or salad. Dinner is a protein, vegetables, and a carbohydrate. Stays active with walking, but no other formal exercise.   Immunizations:  Tdap: 2017, Flu: Would like it today, Covid: Completed 3 doses    REVIEW OF SYSTEMS:   GENERAL: feels well otherwise   SKIN: denies any unusual skin lesions  EYES: no vision problems  BREAST: no lumps or masses, no nipple discharge   LUNGS: denies shortness of breath  CARDIOVASCULAR: denies chest pain  GI: denies abdominal pain,  No constipation or diarrhea, no hematochezia  : denies dysuria, vaginal discharge or itching  NEURO: denies headaches  PSYCHE: denies depression but anxiety          Current Outpatient Medications   Medication Sig Dispense Refill    Magnesium Gluconate 500 (27 Mg) MG Oral Tab Take 1 tablet (500 mg total) by mouth at bedtime.      LORazepam 0.5 MG Oral Tab Take 1 tablet (0.5 mg total) by mouth daily as needed for Anxiety (Panic attacks). (Patient not taking: Reported on 10/30/2024) 15 tablet 1     Allergies[1]   Past Medical History:    Allergic rhinitis    Anxiety    Mother diagnosed with  cancer and passed away suddenly at the end of 2023.      Past Surgical History:   Procedure Laterality Date      May 15, 2017    Pregnancy via IVF      Family History   Problem Relation Age of Onset    Cancer Mother         Lung- in 2023    Other (Lupus) Mother     Diabetes Paternal Grandmother     Diabetes Paternal Grandfather     Anxiety Maternal Aunt     Breast Cancer Neg     Ovarian Cancer Neg       Social History:   Social History     Socioeconomic History    Marital status:    Tobacco Use    Smoking status: Never    Smokeless tobacco: Never   Vaping Use    Vaping status: Never Used   Substance and Sexual Activity    Alcohol use: Yes     Alcohol/week: 2.0 standard drinks of alcohol     Types: 2 Glasses of wine per week     Comment: Maybe occurs once a month    Drug use: Never    Sexual activity: Yes     Partners: Male     Comment:  has azospermia   Other Topics Concern    Caffeine Concern No    Exercise Yes     Comment: I walk daily    Seat Belt Yes     Comment: I wear my seatbelt, as does everyone in my vehicle    Special Diet No    Stress Concern No    Weight Concern No     Occ:  for the [employer]. : Yes. Children: 1 daughter.         EXAM:     Wt Readings from Last 6 Encounters:   10/30/24 102 lb (46.3 kg)   24 100 lb (45.4 kg)   07/10/23 96 lb (43.5 kg)     Body mass index is 20.6 kg/m².   /72 (BP Location: Right arm, Patient Position: Sitting, Cuff Size: adult)   Pulse 76   Ht 4' 11\" (1.499 m)   Wt 102 lb (46.3 kg)   LMP 10/14/2024 (Exact Date)   SpO2 99%   BMI 20.60 kg/m²     GENERAL: well developed, well nourished, in no apparent distress   SKIN: no rashes, no suspicious lesions  HEENT: atraumatic, normocephalic, throat clear; normal dentition  EYES: PERRLA, EOMI, conjunctiva are clear  NECK: supple, no adenopathy or thyroid masses   BREAST: no dominant or suspicious mass, no nipple discharge  LUNGS: clear to  auscultation  CARDIO: RRR without murmur  GI: good bowel sounds, no masses, HSM or tenderness  : deferred, not due for pap smear and no concerns   EXTREMITIES: no edema    Cholesterol, Total (mg/dL)   Date Value   07/10/2023 194     HDL Cholesterol (mg/dL)   Date Value   07/10/2023 89 (H)     LDL Cholesterol (mg/dL)   Date Value   07/10/2023 90      ASSESSMENT AND PLAN:   Nathaly An is a 44 year old female who presents for a complete physical exam.  Encounter Diagnoses   Name Primary?    Encounter for routine adult health examination without abnormal findings Yes    Screening mammogram, encounter for      Orders Placed This Encounter   Procedures    Lipid Panel [E]    Hemoglobin A1C (Glycohemoglobin) [E]    Comp Metabolic Panel (14) [E]    CBC W Differential W Platelet [E]    Fluzone trivalent vaccine, PF 0.5mL, 6mo+ (83845)       Discussed with patient the following:  -Monthly breast self-exam  -Breast cancer screening/mammograms and clinical breast exams  -Cervical cancer screening/pap smears  -Colon cancer screening/colonoscopy  -Adequate calcium and Vitamin D intake to prevent osteoporosis  -Healthy diet including adequate intake of vegetables and fruits, appropriate portion sizes, minimizing highly concentrated carbohydrate foods  -Exercising 30 minutes a day most days of the week   -Diabetes screening which she desires  -Cholesterol screening which she desires  -Recommendation for yearly influenza vaccine  -Need for Tdap once as an adult and Td booster every 10 years  -Need for Zoster vaccine for patients >= 50  -Contraception:  has azospermia   -STI screening (GC/Chlamydia/HIV): Not indicated  -Hepatitis C screening for all adults between the ages of 18 and 79: Checked and negative       All questions were answered during the visit and the patient verbalizes understanding. She will return in one year for next WWE or sooner as needed.    Meds & Refills for this Visit:  Requested  Prescriptions      No prescriptions requested or ordered in this encounter       Imaging & Consults:  INFLUENZA VACCINE, TRI, PRESERV FREE, 0.5 ML  [facility] CURT 2D+3D SCREENING BILAT (CPT=77067/00594)    Tootie Cohen DO  10/30/2024  9:05 AM       [1] No Known Allergies

## 2024-12-26 ENCOUNTER — HOSPITAL ENCOUNTER (OUTPATIENT)
Dept: MAMMOGRAPHY | Age: 44
Discharge: HOME | End: 2024-12-26
Attending: FAMILY MEDICINE
Payer: COMMERCIAL

## 2024-12-26 DIAGNOSIS — Z12.31 SCREENING MAMMOGRAM, ENCOUNTER FOR: ICD-10-CM

## 2024-12-26 PROCEDURE — 77067 SCR MAMMO BI INCL CAD: CPT | Performed by: FAMILY MEDICINE

## 2024-12-26 PROCEDURE — 77063 BREAST TOMOSYNTHESIS BI: CPT | Performed by: FAMILY MEDICINE

## 2025-02-02 ENCOUNTER — APPOINTMENT (OUTPATIENT)
Dept: CT IMAGING | Facility: HOSPITAL | Age: 45
End: 2025-02-02
Payer: COMMERCIAL

## 2025-02-02 ENCOUNTER — HOSPITAL ENCOUNTER (EMERGENCY)
Facility: HOSPITAL | Age: 45
Discharge: HOME | End: 2025-02-02
Payer: COMMERCIAL

## 2025-02-02 VITALS
SYSTOLIC BLOOD PRESSURE: 150 MMHG | HEART RATE: 105 BPM | DIASTOLIC BLOOD PRESSURE: 80 MMHG | OXYGEN SATURATION: 98 % | TEMPERATURE: 98 F | RESPIRATION RATE: 18 BRPM

## 2025-02-02 DIAGNOSIS — S09.90XA CLOSED HEAD INJURY, INITIAL ENCOUNTER: Primary | ICD-10-CM

## 2025-02-02 DIAGNOSIS — S00.83XA TRAUMATIC HEMATOMA OF FOREHEAD, INITIAL ENCOUNTER: ICD-10-CM

## 2025-02-02 PROCEDURE — 70450 CT HEAD/BRAIN W/O DYE: CPT

## 2025-02-02 PROCEDURE — 99284 EMERGENCY DEPT VISIT MOD MDM: CPT

## 2025-02-03 NOTE — ED PROVIDER NOTES
Patient Seen in: [facility] Emergency Department      History     Chief Complaint   Patient presents with    Head Injury     Stated Complaint: hit in head with baseball    Subjective:   45yo/f w no chronic medical problems reports w a closed head injury. She was watching her child play volleyball at an indoor facility when a baseball from an adjacent area struck her on the fly. No loc. No vomiting. No dizziness. NO vision changes. Swelling. Pain. Worsening swelling w time. No hx of anticoagulation or neurosurgery. No pain with rom of eye.               Objective:     Past Medical History:    Allergic rhinitis    Anxiety    Mother diagnosed with cancer and passed away suddenly at the end of 2023.              Past Surgical History:   Procedure Laterality Date      May 15, 2017    Pregnancy via IVF                Social History     Socioeconomic History    Marital status:    Tobacco Use    Smoking status: Never    Smokeless tobacco: Never   Vaping Use    Vaping status: Never Used   Substance and Sexual Activity    Alcohol use: Yes     Alcohol/week: 2.0 standard drinks of alcohol     Types: 2 Glasses of wine per week     Comment: Maybe occurs once a month    Drug use: Never    Sexual activity: Yes     Partners: Male     Comment:  has azospermia   Other Topics Concern    Caffeine Concern No    Exercise Yes     Comment: I walk daily    Seat Belt Yes     Comment: I wear my seatbelt, as does everyone in my vehicle    Special Diet No    Stress Concern No    Weight Concern No                  Physical Exam     ED Triage Vitals [25 1824]   /83   Pulse 105   Resp 20   Temp 98.2 °F (36.8 °C)   Temp src    SpO2 98 %   O2 Device None (Room air)       Current Vitals:   Vital Signs  BP: 150/80  Pulse: 105  Resp: 18  Temp: 98.2 °F (36.8 °C)    Oxygen Therapy  SpO2: 98 %  O2 Device: None (Room air)        Physical Exam  Vitals and nursing note reviewed.   Constitutional:       General: She  is not in acute distress.     Appearance: She is well-developed.   HENT:      Head: Normocephalic.      Comments: Left lateral frontal forehead hematoma along left lateral brow/forehead. No crepitus. No edema. Skin intact. No ecchymosis. No crepitus. EOMI.      Nose: Nose normal.      Mouth/Throat:      Mouth: Mucous membranes are moist.   Eyes:      Conjunctiva/sclera: Conjunctivae normal.      Pupils: Pupils are equal, round, and reactive to light.   Cardiovascular:      Rate and Rhythm: Normal rate and regular rhythm.      Heart sounds: Normal heart sounds.   Pulmonary:      Effort: Pulmonary effort is normal.      Breath sounds: Normal breath sounds.   Abdominal:      General: Bowel sounds are normal.      Palpations: Abdomen is soft.   Musculoskeletal:         General: No tenderness or deformity. Normal range of motion.      Cervical back: Normal range of motion and neck supple.   Skin:     General: Skin is warm and dry.      Capillary Refill: Capillary refill takes less than 2 seconds.      Findings: No rash.      Comments: Normal color   Neurological:      General: No focal deficit present.      Mental Status: She is alert and oriented to person, place, and time.      GCS: GCS eye subscore is 4. GCS verbal subscore is 5. GCS motor subscore is 6.      Cranial Nerves: No cranial nerve deficit.      Gait: Gait normal.             ED Course   Labs Reviewed - No data to display     TECHNIQUE: CT images were obtained without contrast material.  Automated exposure control for dose reduction was used.  Dose information is transmitted to the ACR (American College of Radiology) NRDR (National Radiology Data Registry) which includes the  Dose Index Registry.     FINDINGS:  CEREBRUM: No edema, hemorrhage, mass or acute infarct.  CEREBELLUM: Chiari 1 malformation with cerebellar tonsils extending about 14 mm below the foramen magnum.  No edema, hemorrhage, mass or acute infarct.  BRAINSTEM: No edema, hemorrhage, mass or  acute infarct.  CSF SPACES: No hydrocephalus, subarachnoid hemorrhage, or mass.    SKULL: Skull base and calvarium are intact.  SINUSES: Limited views demonstrate no significant mucosal thickening or fluid.    ORBITS: Limited views are unremarkable.    OTHER: Left frontal/forehead scalp hematoma.              Impression  CONCLUSION:  1. No acute intracranial finding.  2. Chiari 1 malformation .    3. Left forehead/frontal scalp hematoma.           Dictated by (CST): Ori Rosas MD on 2/02/2025 at 7:08 PM             MDM              Medical Decision Making  45yo/f w hx and exam as stated; head injury    Ct non acute  Normal neuro exam  Hematoma  Eomi  No crepitus    Plan  Dc to home  Home care      Amount and/or Complexity of Data Reviewed  Radiology:  Decision-making details documented in ED Course.    Risk  OTC drugs.  Prescription drug management.        Disposition and Plan     Clinical Impression:  1. Closed head injury, initial encounter    2. Traumatic hematoma of forehead, initial encounter         Disposition:  Discharge  2/2/2025  7:46 pm    Follow-up:  Tootie Cohen DO  [address] [facility]  [address] [place] [id number]  [phone]    Follow up in 2 day(s)            Medications Prescribed:  Discharge Medication List as of 2/2/2025  7:47 PM              Supplementary Documentation:

## 2025-02-03 NOTE — ED INITIAL ASSESSMENT (HPI)
S: hit in head by baseball. Unsure of loc. Denies vomiting.    B: see chart    A: large hematoma    R: edso

## 2025-02-05 ENCOUNTER — NURSE TRIAGE (OUTPATIENT)
Facility: CLINIC | Age: 45
End: 2025-02-05

## 2025-02-05 ENCOUNTER — OFFICE VISIT (OUTPATIENT)
Facility: CLINIC | Age: 45
End: 2025-02-05
Payer: COMMERCIAL

## 2025-02-05 VITALS
HEART RATE: 84 BPM | DIASTOLIC BLOOD PRESSURE: 70 MMHG | SYSTOLIC BLOOD PRESSURE: 104 MMHG | OXYGEN SATURATION: 100 % | HEIGHT: 59 IN | WEIGHT: 104 LBS | BODY MASS INDEX: 20.96 KG/M2

## 2025-02-05 DIAGNOSIS — S06.0X1D CONCUSSION WITH LOSS OF CONSCIOUSNESS OF 30 MINUTES OR LESS, SUBSEQUENT ENCOUNTER: Primary | ICD-10-CM

## 2025-02-05 DIAGNOSIS — S00.03XD HEMATOMA OF FRONTAL SCALP, SUBSEQUENT ENCOUNTER: ICD-10-CM

## 2025-02-05 PROCEDURE — 99213 OFFICE O/P EST LOW 20 MIN: CPT | Performed by: FAMILY MEDICINE

## 2025-02-05 NOTE — TELEPHONE ENCOUNTER
Action Requested: Summary for Provider     []  Critical Lab, Recommendations Needed  [] Need Additional Advice  []   FYI    []   Need Orders  [] Need Medications Sent to Pharmacy  []  Other     SUMMARY: Patient scheduled for today at 10:30 AM with Dr. Cohen.    Reason for call: Concussion  Onset: Feb 2, 2025    Spoke to patient, full name and date of birth verified.  Patient was hit with a baseball to her left eyebrow/hairline area on Sunday 2/2/25 and was seen at the emergency room.     Patient had a CT scan (-) for fracture, patient had a hematoma that was approximately 6-inches.     Patient reports her symptoms have not worsened, but she has swelling below her left eye.   Patient has been taking 1 Motrin liquid gel every 4-6 hours with good results.     Patient slept well the past 2 nights, she did not get up during the night to take any Motrin, so she wakes up with a headache, but no other worsening of symptoms.     Patient agreeable to appointment today with Dr. Cohen, scheduled today at 10:30 AM.     Reason for Disposition   Patient wants to be seen    Protocols used: Concussion (mTBI) Less Than 14 Days Ago Follow-Up Call-[facility]

## 2025-02-05 NOTE — PROGRESS NOTES
CC:    Chief Complaint   Patient presents with    Follow - Up     On Sunday went to the ER after getting hit with a baseball and has a concussion.       HPI: 44 year old female here for ER follow-up following closed head injury and concussion.  Her daughter was playing volleyball on Sunday night, and while watching her play she was hit on the left frontal scalp with a baseball.  She passed out for a few seconds when it happened, and had immediate swelling.   Went to the ER at [place], and had a negative head CT.  The swelling has gone down significantly with icing it, but now having swelling and bruising around her left eye.  Has been taking Motrin every 6 hours on average, but not the last two nights.  She is waking up with a headache every morning, and it feels like a pressure.  It gets better with Motrin.   She has been avoiding bright lights, and has been off work all week.     ROS:  General:  No fever, no fatigue, no weight changes  HEENT:  Denies congestion or nasal discharge, no vision changes or pain with eye movements, no hearing loss, no tinnitus   Cardio:  No chest pain  Pulmonary:  No cough, no SOB  GI:  No N/V/D  Dermatologic: Left forehead and eye bruising and swelling   Neuro; headaches, no dizziness     Past Medical History:    Allergic rhinitis    Anxiety    Mother diagnosed with cancer and passed away suddenly at the end of March 2023.       Social History     Socioeconomic History    Marital status:      Spouse name: Not on file    Number of children: Not on file    Years of education: Not on file    Highest education level: Not on file   Occupational History    Not on file   Tobacco Use    Smoking status: Never    Smokeless tobacco: Never   Vaping Use    Vaping status: Never Used   Substance and Sexual Activity    Alcohol use: Yes     Alcohol/week: 2.0 standard drinks of alcohol     Types: 2 Glasses of wine per week     Comment: Maybe occurs once a month    Drug use: Never    Sexual  activity: Yes     Partners: Male     Comment:  has azospermia   Other Topics Concern    Caffeine Concern No    Exercise Yes     Comment: I walk daily    Seat Belt Yes     Comment: I wear my seatbelt, as does everyone in my vehicle    Special Diet No    Stress Concern No    Weight Concern No   Social History Narrative    Not on file     Social Drivers of Health     Food Insecurity: Not on file   Transportation Needs: Not on file   Stress: Not on file   Housing Stability: Not on file       Current Outpatient Medications   Medication Sig Dispense Refill    Magnesium Gluconate 500 (27 Mg) MG Oral Tab Take 1 tablet (500 mg total) by mouth at bedtime.      LORazepam 0.5 MG Oral Tab Take 1 tablet (0.5 mg total) by mouth daily as needed for Anxiety (Panic attacks). (Patient not taking: Reported on 10/30/2024) 15 tablet 1       Patient has no known allergies.      Vitals:   Vitals:    02/05/25 1024   BP: 104/70   Pulse: 84   SpO2: 100%   Weight: 104 lb (47.2 kg)   Height: 4' 11\" (1.499 m)       Body mass index is 21.01 kg/m².    Physical:  General:  Alert, appropriate, no acute distress   HEENT: supple, no tonsillar erythema or exudate, no lymphadenopathy, PERRLA, EOMI  Dermatologic: Left frontal scalp mild hematoma with yellow ecchymoses, left periorbital region with ecchymoses and swelling   Neuro: CN II-XII intact, 5/5 muscle strength bilateral upper and lower extremities, normal cerebellar testing, no focal neurologic deficits     Assessment and Plan: 44 year old female here for ER follow-up due to closed head injury and concussion.     1. Concussion with loss of consciousness of 30 minutes or less, subsequent encounter    - Continue with brain rest and avoidance of screens, loud noises, and bright lights for continued healing  - Not to return to work until headaches have resolved and no longer relying on Motrin for pain  - Continue heat or ice for swelling and pain, and wean down on Motrin as tolerated  - Notify  me if not improving or worsening over the next 2-3 weeks     2. Hematoma of frontal scalp, subsequent encounter    - Plan as above         Tootie Cohen DO  02/05/25  10:53 AM